# Patient Record
Sex: MALE | Race: OTHER | HISPANIC OR LATINO | URBAN - METROPOLITAN AREA
[De-identification: names, ages, dates, MRNs, and addresses within clinical notes are randomized per-mention and may not be internally consistent; named-entity substitution may affect disease eponyms.]

---

## 2023-12-12 ENCOUNTER — EMERGENCY (EMERGENCY)
Facility: HOSPITAL | Age: 43
LOS: 1 days | Discharge: HOME | End: 2023-12-12
Attending: EMERGENCY MEDICINE
Payer: MEDICAID

## 2023-12-12 VITALS
OXYGEN SATURATION: 96 % | TEMPERATURE: 97 F | SYSTOLIC BLOOD PRESSURE: 147 MMHG | HEART RATE: 61 BPM | WEIGHT: 166.89 LBS | DIASTOLIC BLOOD PRESSURE: 100 MMHG | RESPIRATION RATE: 18 BRPM

## 2023-12-12 VITALS
DIASTOLIC BLOOD PRESSURE: 79 MMHG | RESPIRATION RATE: 18 BRPM | TEMPERATURE: 98 F | HEART RATE: 75 BPM | OXYGEN SATURATION: 96 % | SYSTOLIC BLOOD PRESSURE: 122 MMHG

## 2023-12-12 LAB
ALBUMIN SERPL ELPH-MCNC: 3.6 G/DL — SIGNIFICANT CHANGE UP (ref 3.5–5)
ALBUMIN SERPL ELPH-MCNC: 3.6 G/DL — SIGNIFICANT CHANGE UP (ref 3.5–5)
ALP SERPL-CCNC: 86 U/L — SIGNIFICANT CHANGE UP (ref 40–120)
ALP SERPL-CCNC: 86 U/L — SIGNIFICANT CHANGE UP (ref 40–120)
ALT FLD-CCNC: 102 U/L DA — HIGH (ref 10–60)
ALT FLD-CCNC: 102 U/L DA — HIGH (ref 10–60)
ANION GAP SERPL CALC-SCNC: 2 MMOL/L — LOW (ref 5–17)
ANION GAP SERPL CALC-SCNC: 2 MMOL/L — LOW (ref 5–17)
AST SERPL-CCNC: 28 U/L — SIGNIFICANT CHANGE UP (ref 10–40)
AST SERPL-CCNC: 28 U/L — SIGNIFICANT CHANGE UP (ref 10–40)
BASOPHILS # BLD AUTO: 0.04 K/UL — SIGNIFICANT CHANGE UP (ref 0–0.2)
BASOPHILS # BLD AUTO: 0.04 K/UL — SIGNIFICANT CHANGE UP (ref 0–0.2)
BASOPHILS NFR BLD AUTO: 0.4 % — SIGNIFICANT CHANGE UP (ref 0–2)
BASOPHILS NFR BLD AUTO: 0.4 % — SIGNIFICANT CHANGE UP (ref 0–2)
BILIRUB SERPL-MCNC: 0.4 MG/DL — SIGNIFICANT CHANGE UP (ref 0.2–1.2)
BILIRUB SERPL-MCNC: 0.4 MG/DL — SIGNIFICANT CHANGE UP (ref 0.2–1.2)
BUN SERPL-MCNC: 16 MG/DL — SIGNIFICANT CHANGE UP (ref 7–18)
BUN SERPL-MCNC: 16 MG/DL — SIGNIFICANT CHANGE UP (ref 7–18)
CALCIUM SERPL-MCNC: 8.7 MG/DL — SIGNIFICANT CHANGE UP (ref 8.4–10.5)
CALCIUM SERPL-MCNC: 8.7 MG/DL — SIGNIFICANT CHANGE UP (ref 8.4–10.5)
CHLORIDE SERPL-SCNC: 105 MMOL/L — SIGNIFICANT CHANGE UP (ref 96–108)
CHLORIDE SERPL-SCNC: 105 MMOL/L — SIGNIFICANT CHANGE UP (ref 96–108)
CK SERPL-CCNC: 181 U/L — SIGNIFICANT CHANGE UP (ref 35–232)
CK SERPL-CCNC: 181 U/L — SIGNIFICANT CHANGE UP (ref 35–232)
CO2 SERPL-SCNC: 30 MMOL/L — SIGNIFICANT CHANGE UP (ref 22–31)
CO2 SERPL-SCNC: 30 MMOL/L — SIGNIFICANT CHANGE UP (ref 22–31)
CREAT SERPL-MCNC: 0.67 MG/DL — SIGNIFICANT CHANGE UP (ref 0.5–1.3)
CREAT SERPL-MCNC: 0.67 MG/DL — SIGNIFICANT CHANGE UP (ref 0.5–1.3)
EGFR: 118 ML/MIN/1.73M2 — SIGNIFICANT CHANGE UP
EGFR: 118 ML/MIN/1.73M2 — SIGNIFICANT CHANGE UP
EOSINOPHIL # BLD AUTO: 0 K/UL — SIGNIFICANT CHANGE UP (ref 0–0.5)
EOSINOPHIL # BLD AUTO: 0 K/UL — SIGNIFICANT CHANGE UP (ref 0–0.5)
EOSINOPHIL NFR BLD AUTO: 0 % — SIGNIFICANT CHANGE UP (ref 0–6)
EOSINOPHIL NFR BLD AUTO: 0 % — SIGNIFICANT CHANGE UP (ref 0–6)
GLUCOSE SERPL-MCNC: 118 MG/DL — HIGH (ref 70–99)
GLUCOSE SERPL-MCNC: 118 MG/DL — HIGH (ref 70–99)
HCT VFR BLD CALC: 40.9 % — SIGNIFICANT CHANGE UP (ref 39–50)
HCT VFR BLD CALC: 40.9 % — SIGNIFICANT CHANGE UP (ref 39–50)
HGB BLD-MCNC: 14.1 G/DL — SIGNIFICANT CHANGE UP (ref 13–17)
HGB BLD-MCNC: 14.1 G/DL — SIGNIFICANT CHANGE UP (ref 13–17)
IMM GRANULOCYTES NFR BLD AUTO: 0.2 % — SIGNIFICANT CHANGE UP (ref 0–0.9)
IMM GRANULOCYTES NFR BLD AUTO: 0.2 % — SIGNIFICANT CHANGE UP (ref 0–0.9)
LYMPHOCYTES # BLD AUTO: 0.73 K/UL — LOW (ref 1–3.3)
LYMPHOCYTES # BLD AUTO: 0.73 K/UL — LOW (ref 1–3.3)
LYMPHOCYTES # BLD AUTO: 7.6 % — LOW (ref 13–44)
LYMPHOCYTES # BLD AUTO: 7.6 % — LOW (ref 13–44)
MAGNESIUM SERPL-MCNC: 2 MG/DL — SIGNIFICANT CHANGE UP (ref 1.6–2.6)
MAGNESIUM SERPL-MCNC: 2 MG/DL — SIGNIFICANT CHANGE UP (ref 1.6–2.6)
MCHC RBC-ENTMCNC: 31.2 PG — SIGNIFICANT CHANGE UP (ref 27–34)
MCHC RBC-ENTMCNC: 31.2 PG — SIGNIFICANT CHANGE UP (ref 27–34)
MCHC RBC-ENTMCNC: 34.5 GM/DL — SIGNIFICANT CHANGE UP (ref 32–36)
MCHC RBC-ENTMCNC: 34.5 GM/DL — SIGNIFICANT CHANGE UP (ref 32–36)
MCV RBC AUTO: 90.5 FL — SIGNIFICANT CHANGE UP (ref 80–100)
MCV RBC AUTO: 90.5 FL — SIGNIFICANT CHANGE UP (ref 80–100)
MONOCYTES # BLD AUTO: 0.33 K/UL — SIGNIFICANT CHANGE UP (ref 0–0.9)
MONOCYTES # BLD AUTO: 0.33 K/UL — SIGNIFICANT CHANGE UP (ref 0–0.9)
MONOCYTES NFR BLD AUTO: 3.5 % — SIGNIFICANT CHANGE UP (ref 2–14)
MONOCYTES NFR BLD AUTO: 3.5 % — SIGNIFICANT CHANGE UP (ref 2–14)
NEUTROPHILS # BLD AUTO: 8.44 K/UL — HIGH (ref 1.8–7.4)
NEUTROPHILS # BLD AUTO: 8.44 K/UL — HIGH (ref 1.8–7.4)
NEUTROPHILS NFR BLD AUTO: 88.3 % — HIGH (ref 43–77)
NEUTROPHILS NFR BLD AUTO: 88.3 % — HIGH (ref 43–77)
NRBC # BLD: 0 /100 WBCS — SIGNIFICANT CHANGE UP (ref 0–0)
NRBC # BLD: 0 /100 WBCS — SIGNIFICANT CHANGE UP (ref 0–0)
PLATELET # BLD AUTO: 173 K/UL — SIGNIFICANT CHANGE UP (ref 150–400)
PLATELET # BLD AUTO: 173 K/UL — SIGNIFICANT CHANGE UP (ref 150–400)
POTASSIUM SERPL-MCNC: 3.8 MMOL/L — SIGNIFICANT CHANGE UP (ref 3.5–5.3)
POTASSIUM SERPL-MCNC: 3.8 MMOL/L — SIGNIFICANT CHANGE UP (ref 3.5–5.3)
POTASSIUM SERPL-SCNC: 3.8 MMOL/L — SIGNIFICANT CHANGE UP (ref 3.5–5.3)
POTASSIUM SERPL-SCNC: 3.8 MMOL/L — SIGNIFICANT CHANGE UP (ref 3.5–5.3)
PROT SERPL-MCNC: 7.6 G/DL — SIGNIFICANT CHANGE UP (ref 6–8.3)
PROT SERPL-MCNC: 7.6 G/DL — SIGNIFICANT CHANGE UP (ref 6–8.3)
RBC # BLD: 4.52 M/UL — SIGNIFICANT CHANGE UP (ref 4.2–5.8)
RBC # BLD: 4.52 M/UL — SIGNIFICANT CHANGE UP (ref 4.2–5.8)
RBC # FLD: 12.2 % — SIGNIFICANT CHANGE UP (ref 10.3–14.5)
RBC # FLD: 12.2 % — SIGNIFICANT CHANGE UP (ref 10.3–14.5)
SODIUM SERPL-SCNC: 137 MMOL/L — SIGNIFICANT CHANGE UP (ref 135–145)
SODIUM SERPL-SCNC: 137 MMOL/L — SIGNIFICANT CHANGE UP (ref 135–145)
TROPONIN I, HIGH SENSITIVITY RESULT: 4.4 NG/L — SIGNIFICANT CHANGE UP
TROPONIN I, HIGH SENSITIVITY RESULT: 4.4 NG/L — SIGNIFICANT CHANGE UP
WBC # BLD: 9.56 K/UL — SIGNIFICANT CHANGE UP (ref 3.8–10.5)
WBC # BLD: 9.56 K/UL — SIGNIFICANT CHANGE UP (ref 3.8–10.5)
WBC # FLD AUTO: 9.56 K/UL — SIGNIFICANT CHANGE UP (ref 3.8–10.5)
WBC # FLD AUTO: 9.56 K/UL — SIGNIFICANT CHANGE UP (ref 3.8–10.5)

## 2023-12-12 PROCEDURE — 83735 ASSAY OF MAGNESIUM: CPT

## 2023-12-12 PROCEDURE — 82962 GLUCOSE BLOOD TEST: CPT

## 2023-12-12 PROCEDURE — 80053 COMPREHEN METABOLIC PANEL: CPT

## 2023-12-12 PROCEDURE — 99285 EMERGENCY DEPT VISIT HI MDM: CPT

## 2023-12-12 PROCEDURE — 99284 EMERGENCY DEPT VISIT MOD MDM: CPT | Mod: 25

## 2023-12-12 PROCEDURE — 96374 THER/PROPH/DIAG INJ IV PUSH: CPT

## 2023-12-12 PROCEDURE — 96375 TX/PRO/DX INJ NEW DRUG ADDON: CPT

## 2023-12-12 PROCEDURE — 93005 ELECTROCARDIOGRAM TRACING: CPT

## 2023-12-12 PROCEDURE — 82550 ASSAY OF CK (CPK): CPT

## 2023-12-12 PROCEDURE — 84484 ASSAY OF TROPONIN QUANT: CPT

## 2023-12-12 PROCEDURE — 36415 COLL VENOUS BLD VENIPUNCTURE: CPT

## 2023-12-12 PROCEDURE — 85025 COMPLETE CBC W/AUTO DIFF WBC: CPT

## 2023-12-12 RX ORDER — METOCLOPRAMIDE HCL 10 MG
10 TABLET ORAL ONCE
Refills: 0 | Status: COMPLETED | OUTPATIENT
Start: 2023-12-12 | End: 2023-12-12

## 2023-12-12 RX ORDER — MECLIZINE HCL 12.5 MG
12.5 TABLET ORAL ONCE
Refills: 0 | Status: COMPLETED | OUTPATIENT
Start: 2023-12-12 | End: 2023-12-12

## 2023-12-12 RX ORDER — SODIUM CHLORIDE 9 MG/ML
1000 INJECTION INTRAMUSCULAR; INTRAVENOUS; SUBCUTANEOUS ONCE
Refills: 0 | Status: COMPLETED | OUTPATIENT
Start: 2023-12-12 | End: 2023-12-12

## 2023-12-12 RX ORDER — MECLIZINE HCL 12.5 MG
1 TABLET ORAL
Qty: 15 | Refills: 0
Start: 2023-12-12 | End: 2023-12-16

## 2023-12-12 RX ORDER — KETOROLAC TROMETHAMINE 30 MG/ML
30 SYRINGE (ML) INJECTION ONCE
Refills: 0 | Status: DISCONTINUED | OUTPATIENT
Start: 2023-12-12 | End: 2023-12-12

## 2023-12-12 RX ORDER — ONDANSETRON 8 MG/1
1 TABLET, FILM COATED ORAL
Qty: 15 | Refills: 0
Start: 2023-12-12 | End: 2023-12-16

## 2023-12-12 RX ADMIN — Medication 12.5 MILLIGRAM(S): at 16:28

## 2023-12-12 RX ADMIN — SODIUM CHLORIDE 1000 MILLILITER(S): 9 INJECTION INTRAMUSCULAR; INTRAVENOUS; SUBCUTANEOUS at 16:27

## 2023-12-12 RX ADMIN — Medication 104 MILLIGRAM(S): at 16:28

## 2023-12-12 RX ADMIN — Medication 30 MILLIGRAM(S): at 16:34

## 2023-12-12 NOTE — ED PROVIDER NOTE - NSFOLLOWUPINSTRUCTIONS_ED_ALL_ED_FT
Vértigo posicional samir  Benign Positional Vertigo  El vértigo es la sensación de que usted o todo lo que lo rodea se mueve cuando en realidad eso no sucede. El vértigo posicional samir es el tipo de vértigo más común. Generalmente se trata de karan enfermedad no dañina (benigna). Esta afección es posicional. [place] significa que los síntomas son desencadenados por ciertos movimientos y posiciones.    Esta afección puede ser peligrosa si ocurre mientras está haciendo algo que podría suponer un riesgo para usted o para los demás. Incluye actividades ashley conducir u operar maquinaria.    ¿Cuáles son las causas?  El oído interno tiene yu llenos de líquido que ayudan a que el cerebro perciba el movimiento y el equilibrio. Cuando el líquido se mueve, el cerebro recibe mensajes sobre la posición del cuerpo.    En el vértigo posicional samir, los maksim de calcio que están en el oído interno se desprenden y alteran la marcel del oído interno. [place] hace que el cerebro reciba mensajes confusos sobre la posición del cuerpo.    ¿Qué incrementa el riesgo?  Es más probable que sufra esta afección si:  Es edda.  Es mayor de 50 años.  Ha sufrido karan lesión en la shukri recientemente.  Tiene karan enfermedad en el oído interno.  ¿Cuáles son los signos o síntomas?  Generalmente, los síntomas de kristina trastorno se presentan al  la shukri o los ojos en diferentes direcciones. Los síntomas pueden aparecer repentinamente y suelen durar menos de un minuto. Incluyen los siguientes:  Pérdida del equilibrio y caídas.  Sensación de estar dando vueltas o moviéndose.  Sensación de que el entorno está dando vueltas o moviéndose.  Náuseas y vómitos.  Visión borrosa.  Mareos.  Movimientos oculares involuntarios (nistagmo).  Los síntomas pueden ser leves y solo causar problemas menores, o pueden ser graves e interferir en la elder cotidiana. Los episodios de vértigo posicional samir pueden repetirse (volver a aparecer) con el transcurso del tiempo. Los síntomas también pueden mejorar con el tiempo.    ¿Cómo se diagnostica?  Esta afección se puede diagnosticar en función de lo siguiente:  Andreina antecedentes médicos.  Un examen físico de la shukri, el glenna y los oídos.  Pruebas de posición para detectar o estimular el vértigo. Es posible que le pidan que gire la shukri y cambie de posición, ashley pasar de estar sentado a acostarse. El médico estará pendiente por si aparecen síntomas de vértigo.  Jimena vez lo deriven a un médico especialista en problemas de la garganta, la nariz y el oído (otorrinolaringólogo), o a aleksandar que se especializa en trastornos del sistema nervioso (neurólogo).    ¿Cómo se trata?  Medical person standing behind sitting patient using both hands to move patient's head to another position.  Esta afección se puede tratar en karan sesión en la cual el médico le pondrá la shukri en posiciones específicas para ayudar a que los maksim desplazados del oído interno se muevan. El tratamiento de esta afección puede llevar varias sesiones. Cuando los casos son graves, jimena vez haya que realizar karan cirugía, enrrique esto no es frecuente.    En algunos casos, el vértigo posicional samir se resuelve por sí solo en el término de 2 o 4 semanas.    Siga estas instrucciones en valenzuela casa:  Seguridad    Muévase lentamente. Evite algunas posiciones o determinados movimientos repentinos de la shukri y el cuerpo ashley se lo haya indicado el médico.  Evite conducir y operar maquinaria hasta que el médico le indique que es seguro hacerlo.  No rommel ninguna tarea que podría ser peligrosa para usted o para otras personas en sue de vértigo.  Si tiene dificultad para caminar o mantener el equilibrio, use un bastón para mantener la estabilidad. Si se siente mareado o inestable, siéntese de inmediato.  Retome andreina actividades normales según lo indicado por el médico. Pregúntele al médico qué actividades son seguras para usted.  Instrucciones generales    Use los medicamentos de venta ambika y los recetados solamente ashley se lo haya indicado el médico.  Kym suficiente líquido ashley para mantener la orina de color amarillo pálido.  Concurra a todas las visitas de seguimiento. [place] es importante.  Comuníquese con un médico si:  Tiene fiebre.  Valenzuela afección empeora o presenta síntomas nuevos.  Andreina familiares o amigos advierten cambios en valenzuela comportamiento.  Tiene náuseas o vómitos que empeoran.  Tiene adormecimiento o sensación de pinchazos y hormigueo.  Busque ayuda de inmediato si:  Tiene dificultad para hablar o para moverse.  Siempre está mareado o se desmaya.  Presenta ever de shukri intensos.  Tiene debilidad en los brazos o las piernas.  Presenta cambios en la audición o la visión.  Presenta rigidez en el glenna.  Presenta sensibilidad a la susan.  Estos síntomas pueden representar un problema grave que constituye karan emergencia. No espere a hadley si los síntomas desaparecen. Solicite atención médica de inmediato. Comuníquese con el servicio de emergencias de valenzuela localidad (911 en [place]). No conduzca por andreina propios medios hasta el hospital.    Resumen  El vértigo es la sensación de que usted o todo lo que lo rodea se mueve cuando en realidad eso no sucede. El vértigo posicional samir es el tipo de vértigo más común.  La causa de esta afección es el desplazamiento de los maksim de calcio del oído interno. [place] produce karan alteración en karan marcel del oído interno que ayuda al cerebro a percibir el movimiento y el equilibrio.  Los síntomas incluyen pérdida del equilibrio y caídas, sensación de que usted o valenzuela entorno se mueve, náuseas y vómitos, y visión borrosa.  Esta afección se puede diagnosticar en función de los síntomas, un examen físico y pruebas de posicionamiento.  Siga las instrucciones de seguridad que le haya dado el médico y vaya a todas las visitas de seguimiento. [place] es importante.  Esta información no tiene ashley fin reemplazar el consejo del médico. Asegúrese de hacerle al médico cualquier pregunta que tenga.      follow up with your medical doctor  if experience dizziness, place Zofran under your tongue, then take Meclizine Vértigo posicional samir  Benign Positional Vertigo  El vértigo es la sensación de que usted o todo lo que lo rodea se mueve cuando en realidad eso no sucede. El vértigo posicional samir es el tipo de vértigo más común. Generalmente se trata de karan enfermedad no dañina (benigna). Esta afección es posicional. [place] significa que los síntomas son desencadenados por ciertos movimientos y posiciones.    Esta afección puede ser peligrosa si ocurre mientras está haciendo algo que podría suponer un riesgo para usted o para los demás. Incluye actividades ashley conducir u operar maquinaria.    ¿Cuáles son las causas?  El oído interno tiene yu llenos de líquido que ayudan a que el cerebro perciba el movimiento y el equilibrio. Cuando el líquido se mueve, el cerebro recibe mensajes sobre la posición del cuerpo.    En el vértigo posicional samir, los maksim de calcio que están en el oído interno se desprenden y alteran la marcel del oído interno. [place] hace que el cerebro reciba mensajes confusos sobre la posición del cuerpo.    ¿Qué incrementa el riesgo?  Es más probable que sufra esta afección si:  Es edda.  Es mayor de 50 años.  Ha sufrido karan lesión en la shukri recientemente.  Tiene karan enfermedad en el oído interno.  ¿Cuáles son los signos o síntomas?  Generalmente, los síntomas de kristina trastorno se presentan al  la shukri o los ojos en diferentes direcciones. Los síntomas pueden aparecer repentinamente y suelen durar menos de un minuto. Incluyen los siguientes:  Pérdida del equilibrio y caídas.  Sensación de estar dando vueltas o moviéndose.  Sensación de que el entorno está dando vueltas o moviéndose.  Náuseas y vómitos.  Visión borrosa.  Mareos.  Movimientos oculares involuntarios (nistagmo).  Los síntomas pueden ser leves y solo causar problemas menores, o pueden ser graves e interferir en la elder cotidiana. Los episodios de vértigo posicional samir pueden repetirse (volver a aparecer) con el transcurso del tiempo. Los síntomas también pueden mejorar con el tiempo.    ¿Cómo se diagnostica?  Esta afección se puede diagnosticar en función de lo siguiente:  Andreina antecedentes médicos.  Un examen físico de la shukri, el glenna y los oídos.  Pruebas de posición para detectar o estimular el vértigo. Es posible que le pidan que gire la shukri y cambie de posición, ashley pasar de estar sentado a acostarse. El médico estará pendiente por si aparecen síntomas de vértigo.  [place] vez lo deriven a un médico especialista en problemas de la garganta, la nariz y el oído (otorrinolaringólogo), o a aleskandar que se especializa en trastornos del sistema nervioso (neurólogo).    ¿Cómo se trata?  Medical person standing behind sitting patient using both hands to move patient's head to another position.  Esta afección se puede tratar en karan sesión en la cual el médico le pondrá la shukri en posiciones específicas para ayudar a que los maksim desplazados del oído interno se muevan. El tratamiento de esta afección puede llevar varias sesiones. Cuando los casos son graves, jimena vez haya que realizar karan cirugía, enrrique esto no es frecuente.    En algunos casos, el vértigo posicional samir se resuelve por sí solo en el término de 2 o 4 semanas.    Siga estas instrucciones en valenzuela casa:  Seguridad    Muévase lentamente. Evite algunas posiciones o determinados movimientos repentinos de la shukri y el cuerpo ashley se lo haya indicado el médico.  Evite conducir y operar maquinaria hasta que el médico le indique que es seguro hacerlo.  No rommel ninguna tarea que podría ser peligrosa para usted o para otras personas en sue de vértigo.  Si tiene dificultad para caminar o mantener el equilibrio, use un bastón para mantener la estabilidad. Si se siente mareado o inestable, siéntese de inmediato.  Retome andreina actividades normales según lo indicado por el médico. Pregúntele al médico qué actividades son seguras para usted.  Instrucciones generales    Use los medicamentos de venta ambika y los recetados solamente ashley se lo haya indicado el médico.  Kym suficiente líquido ashley para mantener la orina de color amarillo pálido.  Concurra a todas las visitas de seguimiento. [place] es importante.  Comuníquese con un médico si:  Tiene fiebre.  Valenzuela afección empeora o presenta síntomas nuevos.  Andreina familiares o amigos advierten cambios en valenzuela comportamiento.  Tiene náuseas o vómitos que empeoran.  Tiene adormecimiento o sensación de pinchazos y hormigueo.  Busque ayuda de inmediato si:  Tiene dificultad para hablar o para moverse.  Siempre está mareado o se desmaya.  Presenta ever de shukri intensos.  Tiene debilidad en los brazos o las piernas.  Presenta cambios en la audición o la visión.  Presenta rigidez en el glenna.  Presenta sensibilidad a la susan.  Estos síntomas pueden representar un problema grave que constituye karan emergencia. No espere a hadley si los síntomas desaparecen. Solicite atención médica de inmediato. Comuníquese con el servicio de emergencias de valenzuela localidad (911 en [place]). No conduzca por andreina propios medios hasta el hospital.    Resumen  El vértigo es la sensación de que usted o todo lo que lo rodea se mueve cuando en realidad eso no sucede. El vértigo posicional samir es el tipo de vértigo más común.  La causa de esta afección es el desplazamiento de los maksim de calcio del oído interno. [place] produce kaarn alteración en karan marcel del oído interno que ayuda al cerebro a percibir el movimiento y el equilibrio.  Los síntomas incluyen pérdida del equilibrio y caídas, sensación de que usted o valenzuela entorno se mueve, náuseas y vómitos, y visión borrosa.  Esta afección se puede diagnosticar en función de los síntomas, un examen físico y pruebas de posicionamiento.  Siga las instrucciones de seguridad que le haya dado el médico y vaya a todas las visitas de seguimiento. [place] es importante.  Esta información no tiene ashley fin reemplazar el consejo del médico. Asegúrese de hacerle al médico cualquier pregunta que tenga.      follow up with your medical doctor  if experience dizziness, place Zofran under your tongue, then take Meclizine

## 2023-12-12 NOTE — ED PROVIDER NOTE - PATIENT PORTAL LINK FT
You can access the FollowMyHealth Patient Portal offered by [facility] by registering at the following website: [email or web address]. By joining [employer]’s FollowMyHealth portal, you will also be able to view your health information using other applications (apps) compatible with our system. You can access the FollowMyHealth Patient Portal offered by [facility] by registering at the following website: [email or web address]. By joining [facility]’s FollowMyHealth portal, you will also be able to view your health information using other applications (apps) compatible with our system.

## 2023-12-12 NOTE — ED PROVIDER NOTE - CLINICAL SUMMARY MEDICAL DECISION MAKING FREE TEXT BOX
Patient with an episode of BPV, will get labs to rule out electrolyte imbalance, EKG to rule out cardiac.  Will also give meds for his vomiting and headache/dizziness, reassess

## 2023-12-12 NOTE — ED PROVIDER NOTE - OBJECTIVE STATEMENT
43-year-old male with no PMH, complaining of feeling dizzy this morning, vertigo, ataxia, and/V x 1.  Patient denies fever, recent infection, recent cold, head injury or LOC

## 2023-12-12 NOTE — ED ADULT NURSE NOTE - OBJECTIVE STATEMENT
Patient present to ED with c/o dizziness with room spinning with c/o headache since morning with pain scale 5/10

## 2023-12-12 NOTE — ED PROVIDER NOTE - PROGRESS NOTE DETAILS
Labs explained to pt  pt's feeling much better, ambulating with no diff., will d/c home  advised to f/u with PMD

## 2023-12-12 NOTE — ED ADULT NURSE NOTE - NSFALLRISKINTERV_ED_ALL_ED
Assistance OOB with selected safe patient handling equipment if applicable/Assistance with ambulation/Communicate fall risk and risk factors to all staff, patient, and family/Encourage patient to sit up slowly, dangle for a short time, stand at bedside before walking/Monitor gait and stability/Orthostatic vital signs/Provide visual cue: yellow wristband, yellow gown, etc/Reinforce activity limits and safety measures with patient and family/Call bell, personal items and telephone in reach/Instruct patient to call for assistance before getting out of bed/chair/stretcher/Non-slip footwear applied when patient is off stretcher/[place] to call system/Physically safe environment - no spills, clutter or unnecessary equipment/Purposeful Proactive Rounding/Room/bathroom lighting operational, light cord in reach Assistance OOB with selected safe patient handling equipment if applicable/Assistance with ambulation/Communicate fall risk and risk factors to all staff, patient, and family/Encourage patient to sit up slowly, dangle for a short time, stand at bedside before walking/Monitor gait and stability/Orthostatic vital signs/Provide visual cue: yellow wristband, yellow gown, etc/Reinforce activity limits and safety measures with patient and family/Call bell, personal items and telephone in reach/Instruct patient to call for assistance before getting out of bed/chair/stretcher/Non-slip footwear applied when patient is off stretcher/[place] to call system/Physically safe environment - no spills, clutter or unnecessary equipment/Purposeful Proactive Rounding/Room/bathroom lighting operational, light cord in reach